# Patient Record
Sex: MALE | Race: WHITE | URBAN - METROPOLITAN AREA
[De-identification: names, ages, dates, MRNs, and addresses within clinical notes are randomized per-mention and may not be internally consistent; named-entity substitution may affect disease eponyms.]

---

## 2018-04-02 ENCOUNTER — RECORDS - HEALTHEAST (OUTPATIENT)
Dept: LAB | Facility: CLINIC | Age: 73
End: 2018-04-02

## 2018-04-03 LAB
ANION GAP SERPL CALCULATED.3IONS-SCNC: 8 MMOL/L (ref 5–18)
BUN SERPL-MCNC: 8 MG/DL (ref 8–28)
CALCIUM SERPL-MCNC: 9.1 MG/DL (ref 8.5–10.5)
CHLORIDE BLD-SCNC: 104 MMOL/L (ref 98–107)
CO2 SERPL-SCNC: 27 MMOL/L (ref 22–31)
CREAT SERPL-MCNC: 0.73 MG/DL (ref 0.7–1.3)
ERYTHROCYTE [DISTWIDTH] IN BLOOD BY AUTOMATED COUNT: 13.2 % (ref 11–14.5)
FOLATE SERPL-MCNC: 9.9 NG/ML
GFR SERPL CREATININE-BSD FRML MDRD: >60 ML/MIN/1.73M2
GLUCOSE BLD-MCNC: 94 MG/DL (ref 70–125)
HCT VFR BLD AUTO: 40.8 % (ref 40–54)
HGB BLD-MCNC: 13.5 G/DL (ref 14–18)
MCH RBC QN AUTO: 32.4 PG (ref 27–34)
MCHC RBC AUTO-ENTMCNC: 33.1 G/DL (ref 32–36)
MCV RBC AUTO: 98 FL (ref 80–100)
PLATELET # BLD AUTO: 317 THOU/UL (ref 140–440)
PMV BLD AUTO: 10.1 FL (ref 8.5–12.5)
POTASSIUM BLD-SCNC: 3.9 MMOL/L (ref 3.5–5)
RBC # BLD AUTO: 4.17 MILL/UL (ref 4.4–6.2)
SODIUM SERPL-SCNC: 139 MMOL/L (ref 136–145)
TSH SERPL DL<=0.005 MIU/L-ACNC: 1.75 UIU/ML (ref 0.3–5)
VIT B12 SERPL-MCNC: 421 PG/ML (ref 213–816)
WBC: 5.3 THOU/UL (ref 4–11)

## 2024-11-24 ENCOUNTER — DOCUMENTATION ONLY (OUTPATIENT)
Dept: GERIATRICS | Facility: CLINIC | Age: 79
End: 2024-11-24
Payer: COMMERCIAL

## 2024-11-25 ENCOUNTER — TRANSITIONAL CARE UNIT VISIT (OUTPATIENT)
Dept: GERIATRICS | Facility: CLINIC | Age: 79
End: 2024-11-25
Payer: COMMERCIAL

## 2024-11-25 VITALS
WEIGHT: 157 LBS | DIASTOLIC BLOOD PRESSURE: 52 MMHG | TEMPERATURE: 99.2 F | OXYGEN SATURATION: 95 % | SYSTOLIC BLOOD PRESSURE: 113 MMHG | RESPIRATION RATE: 18 BRPM | HEART RATE: 81 BPM | BODY MASS INDEX: 24.64 KG/M2 | HEIGHT: 67 IN

## 2024-11-25 DIAGNOSIS — C61 CARCINOMA OF PROSTATE (H): ICD-10-CM

## 2024-11-25 DIAGNOSIS — N30.80 BACTERIAL CYSTITIS: ICD-10-CM

## 2024-11-25 DIAGNOSIS — K70.30 ALCOHOLIC CIRRHOSIS, UNSPECIFIED WHETHER ASCITES PRESENT (H): ICD-10-CM

## 2024-11-25 DIAGNOSIS — N18.31 STAGE 3A CHRONIC KIDNEY DISEASE (H): ICD-10-CM

## 2024-11-25 DIAGNOSIS — Z86.73 HISTORY OF CVA (CEREBROVASCULAR ACCIDENT): ICD-10-CM

## 2024-11-25 DIAGNOSIS — Z97.8 FOLEY CATHETER IN PLACE: ICD-10-CM

## 2024-11-25 DIAGNOSIS — R47.01 EXPRESSIVE APHASIA: ICD-10-CM

## 2024-11-25 DIAGNOSIS — F32.5 MAJOR DEPRESSIVE DISORDER WITH SINGLE EPISODE, IN FULL REMISSION (H): ICD-10-CM

## 2024-11-25 DIAGNOSIS — N39.0 ACUTE URINARY TRACT INFECTION: Primary | ICD-10-CM

## 2024-11-25 PROBLEM — R60.0 EDEMA OF LOWER EXTREMITY: Status: ACTIVE | Noted: 2020-10-08

## 2024-11-25 PROBLEM — J18.9 PNEUMONIA: Status: ACTIVE | Noted: 2020-10-08

## 2024-11-25 PROBLEM — D64.9 NORMOCYTIC ANEMIA: Status: ACTIVE | Noted: 2021-06-23

## 2024-11-25 PROBLEM — C85.80 MARGINAL ZONE LYMPHOMA (H): Status: ACTIVE | Noted: 2024-11-25

## 2024-11-25 PROBLEM — F32.A DEPRESSIVE DISORDER: Status: ACTIVE | Noted: 2024-11-25

## 2024-11-25 PROBLEM — I87.2 EDEMA OF BOTH LOWER EXTREMITIES DUE TO PERIPHERAL VENOUS INSUFFICIENCY: Status: ACTIVE | Noted: 2020-05-15

## 2024-11-25 PROBLEM — H93.19 TINNITUS: Status: ACTIVE | Noted: 2024-11-25

## 2024-11-25 PROBLEM — L98.491 SKIN ULCER, LIMITED TO BREAKDOWN OF SKIN (H): Status: ACTIVE | Noted: 2024-11-01

## 2024-11-25 PROBLEM — E78.5 HYPERLIPIDEMIA WITH TARGET LOW DENSITY LIPOPROTEIN (LDL) CHOLESTEROL LESS THAN 100 MG/DL: Status: ACTIVE | Noted: 2024-11-25

## 2024-11-25 PROBLEM — R33.9 CHRONIC RETENTION OF URINE: Status: ACTIVE | Noted: 2020-10-08

## 2024-11-25 PROBLEM — L89.159 PRESSURE INJURY OF SKIN OF SACRAL REGION: Status: ACTIVE | Noted: 2020-10-08

## 2024-11-25 PROBLEM — E46 PROTEIN-CALORIE MALNUTRITION (H): Status: ACTIVE | Noted: 2022-02-17

## 2024-11-25 PROBLEM — N13.30 BILATERAL HYDRONEPHROSIS: Status: ACTIVE | Noted: 2024-11-10

## 2024-11-25 PROBLEM — R59.1 LYMPHADENOPATHY: Status: ACTIVE | Noted: 2022-02-17

## 2024-11-25 PROBLEM — J30.2 SEASONAL ALLERGIES: Status: ACTIVE | Noted: 2022-01-14

## 2024-11-25 PROBLEM — L08.1 ERYTHRASMA: Status: ACTIVE | Noted: 2021-02-26

## 2024-11-25 PROBLEM — I87.2 STASIS DERMATITIS: Status: ACTIVE | Noted: 2020-09-05

## 2024-11-25 PROBLEM — R26.9 GAIT ABNORMALITY: Status: ACTIVE | Noted: 2024-11-25

## 2024-11-25 PROBLEM — E53.8 VITAMIN B12 DEFICIENCY: Status: ACTIVE | Noted: 2022-05-01

## 2024-11-25 PROBLEM — C85.90: Status: ACTIVE | Noted: 2023-01-09

## 2024-11-25 PROBLEM — N32.89 BLADDER SPASM: Status: ACTIVE | Noted: 2022-02-17

## 2024-11-25 PROBLEM — R53.1 WEAKNESS: Status: ACTIVE | Noted: 2024-10-31

## 2024-11-25 PROBLEM — L89.92 PRESSURE INJURY, STAGE 2 (H): Status: ACTIVE | Noted: 2021-02-04

## 2024-11-25 PROBLEM — F01.518 VASCULAR DEMENTIA WITH BEHAVIOR DISTURBANCE (H): Status: ACTIVE | Noted: 2020-10-08

## 2024-11-25 PROBLEM — I63.9 STROKE (H): Status: ACTIVE | Noted: 2024-11-25

## 2024-11-25 PROBLEM — L03.119 CELLULITIS OF LOWER EXTREMITY: Status: ACTIVE | Noted: 2024-11-01

## 2024-11-25 PROBLEM — T83.511A: Status: ACTIVE | Noted: 2020-10-08

## 2024-11-25 PROBLEM — B37.2 CANDIDAL DERMATITIS: Status: ACTIVE | Noted: 2022-06-29

## 2024-11-25 PROBLEM — E55.9 VITAMIN D DEFICIENCY: Status: ACTIVE | Noted: 2022-03-09

## 2024-11-25 PROBLEM — R19.00 RETROPERITONEAL MASS: Status: ACTIVE | Noted: 2022-06-29

## 2024-11-25 PROBLEM — H90.3 SENSORINEURAL HEARING LOSS, BILATERAL: Status: ACTIVE | Noted: 2024-11-25

## 2024-11-25 PROBLEM — J43.0 UNILATERAL EMPHYSEMA (H): Status: ACTIVE | Noted: 2022-01-14

## 2024-11-25 PROCEDURE — 99310 SBSQ NF CARE HIGH MDM 45: CPT | Performed by: NURSE PRACTITIONER

## 2024-11-25 RX ORDER — SERTRALINE HYDROCHLORIDE 25 MG/1
125 TABLET, FILM COATED ORAL DAILY
COMMUNITY

## 2024-11-25 RX ORDER — ACETAMINOPHEN 325 MG/1
325-650 TABLET ORAL EVERY 4 HOURS PRN
COMMUNITY

## 2024-11-25 RX ORDER — ALBUTEROL SULFATE 90 UG/1
2 INHALANT RESPIRATORY (INHALATION) EVERY 6 HOURS PRN
COMMUNITY

## 2024-11-25 RX ORDER — ATORVASTATIN CALCIUM 20 MG/1
20 TABLET, FILM COATED ORAL DAILY
COMMUNITY

## 2024-11-25 RX ORDER — ASPIRIN 81 MG/1
81 TABLET ORAL DAILY
COMMUNITY

## 2024-11-25 RX ORDER — NYSTATIN 100000 U/G
CREAM TOPICAL 2 TIMES DAILY
COMMUNITY

## 2024-11-25 RX ORDER — CALCIUM CARBONATE/VITAMIN D3 600 MG-10
1 TABLET ORAL 2 TIMES DAILY
COMMUNITY

## 2024-11-25 RX ORDER — OXYBUTYNIN CHLORIDE 5 MG/1
5 TABLET ORAL 3 TIMES DAILY
COMMUNITY

## 2024-11-25 RX ORDER — LIDOCAINE 40 MG/G
CREAM TOPICAL
COMMUNITY

## 2024-11-25 RX ORDER — AMMONIUM LACTATE 12 G/100G
CREAM TOPICAL 2 TIMES DAILY
COMMUNITY

## 2024-11-25 NOTE — LETTER
11/25/2024      Kade Mcgill  [address] [facility] [facility] [address]        [facility] GERIATRICS    PRIMARY CARE PROVIDER AND CLINIC:  Provider Outside, No address on file  Chief Complaint   Patient presents with     Hospital F/U      [place] Medical Record Number:  [id number]  Place of Service where encounter took place:  [facility] ([facility]) [[id number]]    Kade Mcgill  is a 79 year old  (1945), admitted to the above facility from  [facility] . Hospital stay 11/10/2024 through 11/22/2024..     Patient is seen today for initial NP visit in the TCU:  1. Acute urinary tract infection    2. Alcoholic cirrhosis, unspecified whether ascites present (H)    3. Bacterial cystitis    4. Carcinoma of prostate (H)    5. History of CVA (cerebrovascular accident)    6. Expressive aphasia    7. Stage 3a chronic kidney disease (H)    8. Baron catheter in place    9. Major depressive disorder with single episode, in full remission (H)        HPI:    Treated inpatient for UTI, continues on bactrim through today 11/25. Has a chronic baron. Denies HA, chest pain, palpitations, dizziness. HR controlled. BP stable so far in TCU. No troubles breathing, no SOB, no Concerns with urination- urine dark yellow with fabian blood noted, no constipation. No Eating and drinking very much, he did drink a boost. Sleeping okay. Denies pain.   Lives at home in house with stepfather and stepson, ambulates with walker, WC.      CODE STATUS/ADVANCE DIRECTIVES DISCUSSION:  No Order  DNR / DNI  ALLERGIES:   Allergies   Allergen Reactions     Ciprofloxacin Rash     Rash all over body.  Some itching   He tolerated levofloxacin on 9/2015.    Tolerated Levaquin 1/2023.     Ceftriaxone Rash     Tolerated Zosyn 4/7/2016.     Iodine Other (See Comments) and Rash     Topical iodine     Latex Rash     Povidone Iodine Rash      PAST MEDICAL HISTORY:   Past Medical History:   Diagnosis Date     History of alcohol abuse      History of  cirrhosis of the liver      History of malaria      History of stroke      Hyperlipaemia      Leg fracture, left      Tobacco abuse       PAST SURGICAL HISTORY:   has a past surgical history that includes orthopedic surgery (2011) and vascular surgery.  FAMILY HISTORY: family history includes Cancer in his father; Diabetes in his maternal grandmother; Hypertension in his mother.  SOCIAL HISTORY:   reports that he has been smoking. He has never used smokeless tobacco. He reports current alcohol use. He reports that he does not use drugs.  Patient's living condition: lives with family,       Post Discharge Medication Reconciliation Status:   MED REC REQUIRED    Post Medication Reconciliation Status: discharge medications reconciled and changed, per note/orders       Current Outpatient Medications   Medication Sig Dispense Refill     acetaminophen (TYLENOL) 325 MG tablet Take 325-650 mg by mouth every 4 hours as needed for mild pain.       albuterol (PROAIR HFA/PROVENTIL HFA/VENTOLIN HFA) 108 (90 Base) MCG/ACT inhaler Inhale 2 puffs into the lungs every 6 hours as needed for shortness of breath, wheezing or cough.       ammonium lactate (AMLACTIN) 12 % external cream Apply topically 2 times daily.       aspirin 81 MG EC tablet Take 81 mg by mouth daily.       atorvastatin (LIPITOR) 20 MG tablet Take 20 mg by mouth daily.       calcium carbonate-vitamin D (CALTRATE) 600-10 MG-MCG per tablet Take 1 tablet by mouth 2 times daily.       cholecalciferol (VITAMIN D3) 125 mcg (5000 units) capsule Take 125 mcg by mouth daily.       D-Mannose 500 MG CAPS Take 500 mg by mouth 3 times daily.       lidocaine (LMX4) 4 % external cream Apply topically once as needed for mild pain.       nystatin (MYCOSTATIN) 380322 UNIT/GM external cream Apply topically 2 times daily.       oxyBUTYnin (DITROPAN) 5 MG tablet Take 5 mg by mouth 3 times daily.       sertraline (ZOLOFT) 25 MG tablet Take 125 mg by mouth daily.       No current  "facility-administered medications for this visit.       ROS:  10 point ROS of systems including Constitutional, Eyes, Respiratory, Cardiovascular, Gastroenterology, Genitourinary, Integumentary, Musculoskeletal, Psychiatric were all negative except for pertinent positives noted in my HPI.    Vitals:  /52   Pulse 81   Temp 99.2  F (37.3  C)   Resp 18   Ht 1.702 m (5' 7\")   Wt 71.2 kg (157 lb)   SpO2 95%   BMI 24.59 kg/m    Exam:  GENERAL APPEARANCE:  Alert, in no distress, oriented, cooperative. Comfortable in bed   ENT:  Mouth and posterior oropharynx normal, moist mucous membranes. [place]  EYES:  EOM, conjunctivae, lids, pupils and irises normal  NECK:  No adenopathy,masses or thyromegaly  RESP:  respiratory effort and palpation of chest normal, lungs clear to auscultation , no respiratory distress  CV:  Palpation and auscultation of heart done , regular rate and rhythm, no murmur, rub, or gallop, no edema to LE  GI/ABDOMEN:  normal bowel sounds, soft, nontender, no hepatosplenomegaly or other masses. Orosco in place with fabian blood  M/S:   moves extremities spontaneously.   SKIN:  no rashes to exposed skin.   NEURO:   intact   PSYCH:  oriented X 3, normal insight, judgement and memory, affect and mood normal,       Lab/Diagnostic data:  Recent labs in [facility] reviewed by me today.     CBC:  11/15/24:   WBC 6.7, Hgb 10.5  Creat 0.77    Last Comprehensive Metabolic Panel:  Lab Results   Component Value Date     04/03/2018    POTASSIUM 3.9 04/03/2018    CHLORIDE 104 04/03/2018    CO2 27 04/03/2018    ANIONGAP 8 04/03/2018    GLC 94 04/03/2018    BUN 8 04/03/2018    CR 0.73 04/03/2018    GFRESTIMATED >60 04/03/2018    TRESSA 9.1 04/03/2018     Lab Results   Component Value Date    WBC 5.3 04/03/2018    WBC 7.4 06/28/2011     Lab Results   Component Value Date    RBC 4.17 04/03/2018    RBC 4.24 06/28/2011     Lab Results   Component Value Date    HGB 13.5 04/03/2018    HGB 13.9 06/28/2011     Lab Results "   Component Value Date    HCT 40.8 04/03/2018    HCT 40.0 06/28/2011     Lab Results   Component Value Date    MCV 98 04/03/2018    MCV 94 06/28/2011     Lab Results   Component Value Date    MCH 32.4 04/03/2018    MCH 32.8 06/28/2011     Lab Results   Component Value Date    MCHC 33.1 04/03/2018    MCHC 34.8 06/28/2011     Lab Results   Component Value Date    RDW 13.2 04/03/2018    RDW 13.3 06/28/2011     Lab Results   Component Value Date     04/03/2018     06/28/2011         ASSESSMENT/PLAN:    (N39.0) Acute urinary tract infection  (primary encounter diagnosis)  (Z97.8) Baron catheter in place  (N30.80) Bacterial cystitis  (C61) Carcinoma of prostate (H)  Comment: Chronic baron, fabian blood noted today. No pain. Continues on oral Bactrim with end dose today 11/25  Plan: Monitor, continue baron.     (K70.30) Alcoholic cirrhosis, unspecified whether ascites present (H)  Comment: noted   Plan: encourage cessation    (Z86.73) History of CVA (cerebrovascular accident)  (R47.01) Expressive aphasia  Comment: ASA, statin PTA   Plan: Continue, PT/OT    (N18.31) Stage 3a chronic kidney disease (H)  Creat 0.77  Monitor     (F32.5) Major depressive disorder with single episode, in full remission (H)  Comment: PTA sertraline   Plan: continue         Orders:  Boost BID in between meals    Electronically signed by:  Francia Dillon CNP     Total time greater than 45 minutes reviewing chart in EPIC and PCC, medications, labs, vitals. Discussing with social work, physical therapy, occupational therapy and nursing.                   Sincerely,        Francia Dillon CNP

## 2024-11-25 NOTE — PROGRESS NOTES
[facility] GERIATRICS    PRIMARY CARE PROVIDER AND CLINIC:  Provider Outside, No address on file  Chief Complaint   Patient presents with    Hospital F/U      [place] Medical Record Number:  [id number]  Place of Service where encounter took place:  [facility] ([facility]) [[id number]]    Kade Mcgill  is a 79 year old  (1945), admitted to the above facility from  [facility] . Hospital stay 11/10/2024 through 11/22/2024..     Patient is seen today for initial NP visit in the TCU:  1. Acute urinary tract infection    2. Alcoholic cirrhosis, unspecified whether ascites present (H)    3. Bacterial cystitis    4. Carcinoma of prostate (H)    5. History of CVA (cerebrovascular accident)    6. Expressive aphasia    7. Stage 3a chronic kidney disease (H)    8. Baron catheter in place    9. Major depressive disorder with single episode, in full remission (H)        HPI:    Treated inpatient for UTI, continues on bactrim through today 11/25. Has a chronic baron. Denies HA, chest pain, palpitations, dizziness. HR controlled. BP stable so far in TCU. No troubles breathing, no SOB, no Concerns with urination- urine dark yellow with fabian blood noted, no constipation. No Eating and drinking very much, he did drink a boost. Sleeping okay. Denies pain.   Lives at home in house with stepfather and stepson, ambulates with walker, WC.      CODE STATUS/ADVANCE DIRECTIVES DISCUSSION:  No Order  DNR / DNI  ALLERGIES:   Allergies   Allergen Reactions    Ciprofloxacin Rash     Rash all over body.  Some itching   He tolerated levofloxacin on 9/2015.    Tolerated Levaquin 1/2023.    Ceftriaxone Rash     Tolerated Zosyn 4/7/2016.    Iodine Other (See Comments) and Rash     Topical iodine    Latex Rash    Povidone Iodine Rash      PAST MEDICAL HISTORY:   Past Medical History:   Diagnosis Date    History of alcohol abuse     History of cirrhosis of the liver     History of malaria     History of stroke      Hyperlipaemia     Leg fracture, left     Tobacco abuse       PAST SURGICAL HISTORY:   has a past surgical history that includes orthopedic surgery (2011) and vascular surgery.  FAMILY HISTORY: family history includes Cancer in his father; Diabetes in his maternal grandmother; Hypertension in his mother.  SOCIAL HISTORY:   reports that he has been smoking. He has never used smokeless tobacco. He reports current alcohol use. He reports that he does not use drugs.  Patient's living condition: lives with family,       Post Discharge Medication Reconciliation Status:   MED REC REQUIRED    Post Medication Reconciliation Status: discharge medications reconciled and changed, per note/orders       Current Outpatient Medications   Medication Sig Dispense Refill    acetaminophen (TYLENOL) 325 MG tablet Take 325-650 mg by mouth every 4 hours as needed for mild pain.      albuterol (PROAIR HFA/PROVENTIL HFA/VENTOLIN HFA) 108 (90 Base) MCG/ACT inhaler Inhale 2 puffs into the lungs every 6 hours as needed for shortness of breath, wheezing or cough.      ammonium lactate (AMLACTIN) 12 % external cream Apply topically 2 times daily.      aspirin 81 MG EC tablet Take 81 mg by mouth daily.      atorvastatin (LIPITOR) 20 MG tablet Take 20 mg by mouth daily.      calcium carbonate-vitamin D (CALTRATE) 600-10 MG-MCG per tablet Take 1 tablet by mouth 2 times daily.      cholecalciferol (VITAMIN D3) 125 mcg (5000 units) capsule Take 125 mcg by mouth daily.      D-Mannose 500 MG CAPS Take 500 mg by mouth 3 times daily.      lidocaine (LMX4) 4 % external cream Apply topically once as needed for mild pain.      nystatin (MYCOSTATIN) 462491 UNIT/GM external cream Apply topically 2 times daily.      oxyBUTYnin (DITROPAN) 5 MG tablet Take 5 mg by mouth 3 times daily.      sertraline (ZOLOFT) 25 MG tablet Take 125 mg by mouth daily.       No current facility-administered medications for this visit.       ROS:  10 point ROS of systems including  "Constitutional, Eyes, Respiratory, Cardiovascular, Gastroenterology, Genitourinary, Integumentary, Musculoskeletal, Psychiatric were all negative except for pertinent positives noted in my HPI.    Vitals:  /52   Pulse 81   Temp 99.2  F (37.3  C)   Resp 18   Ht 1.702 m (5' 7\")   Wt 71.2 kg (157 lb)   SpO2 95%   BMI 24.59 kg/m    Exam:  GENERAL APPEARANCE:  Alert, in no distress, oriented, cooperative. Comfortable in bed   ENT:  Mouth and posterior oropharynx normal, moist mucous membranes. [place]  EYES:  EOM, conjunctivae, lids, pupils and irises normal  NECK:  No adenopathy,masses or thyromegaly  RESP:  respiratory effort and palpation of chest normal, lungs clear to auscultation , no respiratory distress  CV:  Palpation and auscultation of heart done , regular rate and rhythm, no murmur, rub, or gallop, no edema to LE  GI/ABDOMEN:  normal bowel sounds, soft, nontender, no hepatosplenomegaly or other masses. Orosco in place with fabian blood  M/S:   moves extremities spontaneously.   SKIN:  no rashes to exposed skin.   NEURO:   intact   PSYCH:  oriented X 3, normal insight, judgement and memory, affect and mood normal,       Lab/Diagnostic data:  Recent labs in [facility] reviewed by me today.     CBC:  11/15/24:   WBC 6.7, Hgb 10.5  Creat 0.77    Last Comprehensive Metabolic Panel:  Lab Results   Component Value Date     04/03/2018    POTASSIUM 3.9 04/03/2018    CHLORIDE 104 04/03/2018    CO2 27 04/03/2018    ANIONGAP 8 04/03/2018    GLC 94 04/03/2018    BUN 8 04/03/2018    CR 0.73 04/03/2018    GFRESTIMATED >60 04/03/2018    TRESSA 9.1 04/03/2018     Lab Results   Component Value Date    WBC 5.3 04/03/2018    WBC 7.4 06/28/2011     Lab Results   Component Value Date    RBC 4.17 04/03/2018    RBC 4.24 06/28/2011     Lab Results   Component Value Date    HGB 13.5 04/03/2018    HGB 13.9 06/28/2011     Lab Results   Component Value Date    HCT 40.8 04/03/2018    HCT 40.0 06/28/2011     Lab Results   Component " Value Date    MCV 98 04/03/2018    MCV 94 06/28/2011     Lab Results   Component Value Date    MCH 32.4 04/03/2018    MCH 32.8 06/28/2011     Lab Results   Component Value Date    MCHC 33.1 04/03/2018    MCHC 34.8 06/28/2011     Lab Results   Component Value Date    RDW 13.2 04/03/2018    RDW 13.3 06/28/2011     Lab Results   Component Value Date     04/03/2018     06/28/2011         ASSESSMENT/PLAN:    (N39.0) Acute urinary tract infection  (primary encounter diagnosis)  (Z97.8) Baron catheter in place  (N30.80) Bacterial cystitis  (C61) Carcinoma of prostate (H)  Comment: Chronic baron, fabian blood noted today. No pain. Continues on oral Bactrim with end dose today 11/25  Plan: Monitor, continue baron.     (K70.30) Alcoholic cirrhosis, unspecified whether ascites present (H)  Comment: noted   Plan: encourage cessation    (Z86.73) History of CVA (cerebrovascular accident)  (R47.01) Expressive aphasia  Comment: ASA, statin PTA   Plan: Continue, PT/OT    (N18.31) Stage 3a chronic kidney disease (H)  Creat 0.77  Monitor     (F32.5) Major depressive disorder with single episode, in full remission (H)  Comment: PTA sertraline   Plan: continue         Orders:  Boost BID in between meals    Electronically signed by:  Francia Dillon CNP     Total time greater than 45 minutes reviewing chart in EPIC and PCC, medications, labs, vitals. Discussing with social work, physical therapy, occupational therapy and nursing.

## 2024-12-02 ENCOUNTER — LAB REQUISITION (OUTPATIENT)
Dept: LAB | Facility: CLINIC | Age: 79
End: 2024-12-02
Payer: COMMERCIAL

## 2024-12-02 ENCOUNTER — TRANSITIONAL CARE UNIT VISIT (OUTPATIENT)
Dept: GERIATRICS | Facility: CLINIC | Age: 79
End: 2024-12-02
Payer: COMMERCIAL

## 2024-12-02 VITALS
HEART RATE: 66 BPM | TEMPERATURE: 98.3 F | BODY MASS INDEX: 24.71 KG/M2 | OXYGEN SATURATION: 97 % | RESPIRATION RATE: 18 BRPM | HEIGHT: 67 IN | SYSTOLIC BLOOD PRESSURE: 135 MMHG | WEIGHT: 157.4 LBS | DIASTOLIC BLOOD PRESSURE: 56 MMHG

## 2024-12-02 DIAGNOSIS — N39.0 ACUTE URINARY TRACT INFECTION: ICD-10-CM

## 2024-12-02 DIAGNOSIS — Z97.8 FOLEY CATHETER IN PLACE: ICD-10-CM

## 2024-12-02 DIAGNOSIS — N18.31 STAGE 3A CHRONIC KIDNEY DISEASE (H): ICD-10-CM

## 2024-12-02 DIAGNOSIS — N30.80 BACTERIAL CYSTITIS: Primary | ICD-10-CM

## 2024-12-02 DIAGNOSIS — C61 CARCINOMA OF PROSTATE (H): ICD-10-CM

## 2024-12-02 DIAGNOSIS — Z86.73 HISTORY OF CVA (CEREBROVASCULAR ACCIDENT): ICD-10-CM

## 2024-12-02 DIAGNOSIS — K70.30 ALCOHOLIC CIRRHOSIS, UNSPECIFIED WHETHER ASCITES PRESENT (H): ICD-10-CM

## 2024-12-02 DIAGNOSIS — F32.5 MAJOR DEPRESSIVE DISORDER WITH SINGLE EPISODE, IN FULL REMISSION (H): ICD-10-CM

## 2024-12-02 DIAGNOSIS — R65.20 SEVERE SEPSIS WITHOUT SEPTIC SHOCK (CODE) (H): ICD-10-CM

## 2024-12-02 DIAGNOSIS — R47.01 EXPRESSIVE APHASIA: ICD-10-CM

## 2024-12-02 PROCEDURE — 99310 SBSQ NF CARE HIGH MDM 45: CPT | Performed by: NURSE PRACTITIONER

## 2024-12-02 NOTE — PROGRESS NOTES
[facility] GERIATRICS    PRIMARY CARE PROVIDER AND CLINIC:  Provider Outside, No address on file  Chief Complaint   Patient presents with    SAMRA      [place] Medical Record Number:  [id number]  Place of Service where encounter took place:  [facility] ([facility]) [[id number]]    Kade Mcgill  is a 79 year old  (1945), admitted to the above facility from  [facility] . Hospital stay 11/10/2024 through 11/22/2024..     Patient is seen today for follow up NP visit in the TCU:  1. Bacterial cystitis    2. Alcoholic cirrhosis, unspecified whether ascites present (H)    3. History of CVA (cerebrovascular accident)    4. Expressive aphasia    5. Baron catheter in place    6. Stage 3a chronic kidney disease (H)    7. Carcinoma of prostate (H)    8. Acute urinary tract infection    9. Major depressive disorder with single episode, in full remission (H)        HPI:    Treated inpatient for UTI, continues on bactrim through completed on 11/25. Has a chronic baron. Hematuria present today, it was clear over the weekend. Denies HA, chest pain, palpitations, dizziness. No troubles breathing, no SOB, no Concerns with urination- urine dark yellow with fabian blood noted, no constipation. Boost ordered as he eats and drinks little. Sleeping okay. Denies pain.   Lives at home in house with stepfather and stepson, ambulates with walker, WC.      CODE STATUS/ADVANCE DIRECTIVES DISCUSSION:  No CPR- Do NOT Intubate  DNR / DNI  ALLERGIES:   Allergies   Allergen Reactions    Ciprofloxacin Rash     Rash all over body.  Some itching   He tolerated levofloxacin on 9/2015.    Tolerated Levaquin 1/2023.    Ceftriaxone Rash     Tolerated Zosyn 4/7/2016.    Iodine Other (See Comments) and Rash     Topical iodine    Latex Rash    Povidone Iodine Rash      PAST MEDICAL HISTORY:   Past Medical History:   Diagnosis Date    History of alcohol abuse     History of cirrhosis of the liver     History of malaria     History of  stroke     Hyperlipaemia     Leg fracture, left     Tobacco abuse       PAST SURGICAL HISTORY:   has a past surgical history that includes orthopedic surgery (2011) and vascular surgery.  FAMILY HISTORY: family history includes Cancer in his father; Diabetes in his maternal grandmother; Hypertension in his mother.  SOCIAL HISTORY:   reports that he has been smoking. He has never used smokeless tobacco. He reports current alcohol use. He reports that he does not use drugs.  Patient's living condition: lives with family,       Post Discharge Medication Reconciliation Status:   MED REC REQUIRED    Post Medication Reconciliation Status: discharge medications reconciled and changed, per note/orders       Current Outpatient Medications   Medication Sig Dispense Refill    acetaminophen (TYLENOL) 325 MG tablet Take 325-650 mg by mouth every 4 hours as needed for mild pain.      albuterol (PROAIR HFA/PROVENTIL HFA/VENTOLIN HFA) 108 (90 Base) MCG/ACT inhaler Inhale 2 puffs into the lungs every 6 hours as needed for shortness of breath, wheezing or cough.      ammonium lactate (AMLACTIN) 12 % external cream Apply topically 2 times daily.      aspirin 81 MG EC tablet Take 81 mg by mouth daily.      atorvastatin (LIPITOR) 20 MG tablet Take 20 mg by mouth daily.      calcium carbonate-vitamin D (CALTRATE) 600-10 MG-MCG per tablet Take 1 tablet by mouth 2 times daily.      cholecalciferol (VITAMIN D3) 125 mcg (5000 units) capsule Take 125 mcg by mouth daily.      D-Mannose 500 MG CAPS Take 500 mg by mouth 3 times daily.      lidocaine (LMX4) 4 % external cream Apply topically once as needed for mild pain.      nystatin (MYCOSTATIN) 942908 UNIT/GM external cream Apply topically 2 times daily.      oxyBUTYnin (DITROPAN) 5 MG tablet Take 5 mg by mouth 3 times daily.      sertraline (ZOLOFT) 25 MG tablet Take 125 mg by mouth daily.       No current facility-administered medications for this visit.       ROS:  10 point ROS of systems  "including Constitutional, Eyes, Respiratory, Cardiovascular, Gastroenterology, Genitourinary, Integumentary, Musculoskeletal, Psychiatric were all negative except for pertinent positives noted in my HPI.    Vitals:  /56   Pulse 66   Temp 98.3  F (36.8  C)   Resp 18   Ht 1.702 m (5' 7\")   Wt 71.4 kg (157 lb 6.4 oz)   SpO2 97%   BMI 24.65 kg/m    Exam:  GENERAL APPEARANCE:  Alert, in no distress, oriented, cooperative. Comfortable sitting up in chair eating breakfast.   ENT:  Mouth and posterior oropharynx normal, moist mucous membranes. [place]  EYES:  EOM, conjunctivae, lids, pupils and irises normal  NECK:  No adenopathy,masses or thyromegaly  RESP:  respiratory effort and palpation of chest normal, lungs clear to auscultation , no respiratory distress  CV:  Palpation and auscultation of heart done , regular rate and rhythm, no murmur, rub, or gallop, no edema to LE  GI/ABDOMEN:  normal bowel sounds, soft, nontender, no hepatosplenomegaly or other masses. [place]rosco in place with fabian blood  M/S:   moves extremities spontaneously.   SKIN:  no rashes to exposed skin.   NEURO:   intact   PSYCH:  oriented X 3, normal insight, judgement and memory, affect and mood normal,       Lab/Diagnostic data:  Recent labs in [facility] reviewed by me today.       CBC:  11/15/24:   WBC 6.7, Hgb 10.5  Creat 0.77    Last Comprehensive Metabolic Panel:  Lab Results   Component Value Date     04/03/2018    POTASSIUM 3.9 04/03/2018    CHLORIDE 104 04/03/2018    CO2 27 04/03/2018    ANIONGAP 8 04/03/2018    GLC 94 04/03/2018    BUN 8 04/03/2018    CR 0.73 04/03/2018    GFRESTIMATED >60 04/03/2018    TRESSA 9.1 04/03/2018     Lab Results   Component Value Date    WBC 5.3 04/03/2018    WBC 7.4 06/28/2011     Lab Results   Component Value Date    RBC 4.17 04/03/2018    RBC 4.24 06/28/2011     Lab Results   Component Value Date    HGB 13.5 04/03/2018    HGB 13.9 06/28/2011     Lab Results   Component Value Date    HCT 40.8 04/03/2018    " HCT 40.0 06/28/2011     Lab Results   Component Value Date    MCV 98 04/03/2018    MCV 94 06/28/2011     Lab Results   Component Value Date    MCH 32.4 04/03/2018    MCH 32.8 06/28/2011     Lab Results   Component Value Date    MCHC 33.1 04/03/2018    MCHC 34.8 06/28/2011     Lab Results   Component Value Date    RDW 13.2 04/03/2018    RDW 13.3 06/28/2011     Lab Results   Component Value Date     04/03/2018     06/28/2011         ASSESSMENT/PLAN:    (N39.0) Acute urinary tract infection  (primary encounter diagnosis)  (Z97.8) Baron catheter in place  (N30.80) Bacterial cystitis  (C61) Carcinoma of prostate (H)  Comment: Chronic baron, fabian blood noted today. No pain. Completed oral Bactrim 11/25  Plan: Monitor, continue baron.     Fabian blood in urine:  Intermittent  May be pulling at catheter, will hold ASA for a few days and monitor     (K70.30) Alcoholic cirrhosis, unspecified whether ascites present (H)  Comment: noted   Plan: encourage cessation    (Z86.73) History of CVA (cerebrovascular accident)  (R47.01) Expressive aphasia  Comment: ASA, statin PTA   Plan: Continue, PT/OT    (N18.31) Stage 3a chronic kidney disease (H)  Creat 0.77  Monitor     (F32.5) Major depressive disorder with single episode, in full remission (H)  Comment: PTA sertraline   Plan: continue     Electronically signed by:  Francia Dillon CNP     Total time greater than 45 minutes reviewing chart in EPIC and PCC, medications, labs, vitals. Discussing with social work, physical therapy, occupational therapy and nursing.

## 2024-12-02 NOTE — LETTER
12/2/2024      Kade Mcgill  [address] [facility] [address]        [facility] GERIATRICS    PRIMARY CARE PROVIDER AND CLINIC:  Provider Outside, No address on file  Chief Complaint   Patient presents with     RECHECK      [place] Medical Record Number:  [id number]  Place of Service where encounter took place:  [facility] ([facility]) [[id number]]    Kade Mcgill  is a 79 year old  (1945), admitted to the above facility from  [facility] . Hospital stay 11/10/2024 through 11/22/2024..     Patient is seen today for follow up NP visit in the TCU:  1. Bacterial cystitis    2. Alcoholic cirrhosis, unspecified whether ascites present (H)    3. History of CVA (cerebrovascular accident)    4. Expressive aphasia    5. Baron catheter in place    6. Stage 3a chronic kidney disease (H)    7. Carcinoma of prostate (H)    8. Acute urinary tract infection    9. Major depressive disorder with single episode, in full remission (H)        HPI:    Treated inpatient for UTI, continues on bactrim through completed on 11/25. Has a chronic baron. Hematuria present today, it was clear over the weekend. Denies HA, chest pain, palpitations, dizziness. No troubles breathing, no SOB, no Concerns with urination- urine dark yellow with fabian blood noted, no constipation. Boost ordered as he eats and drinks little. Sleeping okay. Denies pain.   Lives at home in house with stepfather and stepson, ambulates with walker, WC.      CODE STATUS/ADVANCE DIRECTIVES DISCUSSION:  No CPR- Do NOT Intubate  DNR / DNI  ALLERGIES:   Allergies   Allergen Reactions     Ciprofloxacin Rash     Rash all over body.  Some itching   He tolerated levofloxacin on 9/2015.    Tolerated Levaquin 1/2023.     Ceftriaxone Rash     Tolerated Zosyn 4/7/2016.     Iodine Other (See Comments) and Rash     Topical iodine     Latex Rash     Povidone Iodine Rash      PAST MEDICAL HISTORY:   Past Medical History:   Diagnosis Date     History of alcohol  abuse      History of cirrhosis of the liver      History of malaria      History of stroke      Hyperlipaemia      Leg fracture, left      Tobacco abuse       PAST SURGICAL HISTORY:   has a past surgical history that includes orthopedic surgery (2011) and vascular surgery.  FAMILY HISTORY: family history includes Cancer in his father; Diabetes in his maternal grandmother; Hypertension in his mother.  SOCIAL HISTORY:   reports that he has been smoking. He has never used smokeless tobacco. He reports current alcohol use. He reports that he does not use drugs.  Patient's living condition: lives with family,       Post Discharge Medication Reconciliation Status:   MED REC REQUIRED    Post Medication Reconciliation Status: discharge medications reconciled and changed, per note/orders       Current Outpatient Medications   Medication Sig Dispense Refill     acetaminophen (TYLENOL) 325 MG tablet Take 325-650 mg by mouth every 4 hours as needed for mild pain.       albuterol (PROAIR HFA/PROVENTIL HFA/VENTOLIN HFA) 108 (90 Base) MCG/ACT inhaler Inhale 2 puffs into the lungs every 6 hours as needed for shortness of breath, wheezing or cough.       ammonium lactate (AMLACTIN) 12 % external cream Apply topically 2 times daily.       aspirin 81 MG EC tablet Take 81 mg by mouth daily.       atorvastatin (LIPITOR) 20 MG tablet Take 20 mg by mouth daily.       calcium carbonate-vitamin D (CALTRATE) 600-10 MG-MCG per tablet Take 1 tablet by mouth 2 times daily.       cholecalciferol (VITAMIN D3) 125 mcg (5000 units) capsule Take 125 mcg by mouth daily.       D-Mannose 500 MG CAPS Take 500 mg by mouth 3 times daily.       lidocaine (LMX4) 4 % external cream Apply topically once as needed for mild pain.       nystatin (MYCOSTATIN) 133869 UNIT/GM external cream Apply topically 2 times daily.       oxyBUTYnin (DITROPAN) 5 MG tablet Take 5 mg by mouth 3 times daily.       sertraline (ZOLOFT) 25 MG tablet Take 125 mg by mouth daily.    "    No current facility-administered medications for this visit.       ROS:  10 point ROS of systems including Constitutional, Eyes, Respiratory, Cardiovascular, Gastroenterology, Genitourinary, Integumentary, Musculoskeletal, Psychiatric were all negative except for pertinent positives noted in my HPI.    Vitals:  /56   Pulse 66   Temp 98.3  F (36.8  C)   Resp 18   Ht 1.702 m (5' 7\")   Wt 71.4 kg (157 lb 6.4 oz)   SpO2 97%   BMI 24.65 kg/m    Exam:  GENERAL APPEARANCE:  Alert, in no distress, oriented, cooperative. Comfortable sitting up in chair eating breakfast.   ENT:  Mouth and posterior oropharynx normal, moist mucous membranes. [place]  EYES:  EOM, conjunctivae, lids, pupils and irises normal  NECK:  No adenopathy,masses or thyromegaly  RESP:  respiratory effort and palpation of chest normal, lungs clear to auscultation , no respiratory distress  CV:  Palpation and auscultation of heart done , regular rate and rhythm, no murmur, rub, or gallop, no edema to LE  GI/ABDOMEN:  normal bowel sounds, soft, nontender, no hepatosplenomegaly or other masses. [place] in place with fabian blood  M/S:   moves extremities spontaneously.   SKIN:  no rashes to exposed skin.   NEURO:   intact   PSYCH:  oriented X 3, normal insight, judgement and memory, affect and mood normal,       Lab/Diagnostic data:  Recent labs in [facility] reviewed by me today.       CBC:  11/15/24:   WBC 6.7, Hgb 10.5  Creat 0.77    Last Comprehensive Metabolic Panel:  Lab Results   Component Value Date     04/03/2018    POTASSIUM 3.9 04/03/2018    CHLORIDE 104 04/03/2018    CO2 27 04/03/2018    ANIONGAP 8 04/03/2018    GLC 94 04/03/2018    BUN 8 04/03/2018    CR 0.73 04/03/2018    GFRESTIMATED >60 04/03/2018    TRESSA 9.1 04/03/2018     Lab Results   Component Value Date    WBC 5.3 04/03/2018    WBC 7.4 06/28/2011     Lab Results   Component Value Date    RBC 4.17 04/03/2018    RBC 4.24 06/28/2011     Lab Results   Component Value Date    HGB 13.5 " 04/03/2018    HGB 13.9 06/28/2011     Lab Results   Component Value Date    HCT 40.8 04/03/2018    HCT 40.0 06/28/2011     Lab Results   Component Value Date    MCV 98 04/03/2018    MCV 94 06/28/2011     Lab Results   Component Value Date    MCH 32.4 04/03/2018    MCH 32.8 06/28/2011     Lab Results   Component Value Date    MCHC 33.1 04/03/2018    MCHC 34.8 06/28/2011     Lab Results   Component Value Date    RDW 13.2 04/03/2018    RDW 13.3 06/28/2011     Lab Results   Component Value Date     04/03/2018     06/28/2011         ASSESSMENT/PLAN:    (N39.0) Acute urinary tract infection  (primary encounter diagnosis)  (Z97.8) Baron catheter in place  (N30.80) Bacterial cystitis  (C61) Carcinoma of prostate (H)  Comment: Chronic baron, fabian blood noted today. No pain. Completed oral Bactrim 11/25  Plan: Monitor, continue baron.     Fabian blood in urine:  Intermittent  May be pulling at catheter, will hold ASA for a few days and monitor     (K70.30) Alcoholic cirrhosis, unspecified whether ascites present (H)  Comment: noted   Plan: encourage cessation    (Z86.73) History of CVA (cerebrovascular accident)  (R47.01) Expressive aphasia  Comment: ASA, statin PTA   Plan: Continue, PT/OT    (N18.31) Stage 3a chronic kidney disease (H)  Creat 0.77  Monitor     (F32.5) Major depressive disorder with single episode, in full remission (H)  Comment: PTA sertraline   Plan: continue     Electronically signed by:  Francia Dillon CNP     Total time greater than 45 minutes reviewing chart in EPIC and PCC, medications, labs, vitals. Discussing with social work, physical therapy, occupational therapy and nursing.                   Sincerely,        Francia Dillon CNP

## 2024-12-03 LAB
ANION GAP SERPL CALCULATED.3IONS-SCNC: 9 MMOL/L (ref 7–15)
BUN SERPL-MCNC: 26.2 MG/DL (ref 8–23)
CALCIUM SERPL-MCNC: 8.7 MG/DL (ref 8.8–10.4)
CHLORIDE SERPL-SCNC: 105 MMOL/L (ref 98–107)
CREAT SERPL-MCNC: 1.16 MG/DL (ref 0.67–1.17)
EGFRCR SERPLBLD CKD-EPI 2021: 65 ML/MIN/1.73M2
ERYTHROCYTE [DISTWIDTH] IN BLOOD BY AUTOMATED COUNT: 15.6 % (ref 10–15)
GLUCOSE SERPL-MCNC: 96 MG/DL (ref 70–99)
HCO3 SERPL-SCNC: 23 MMOL/L (ref 22–29)
HCT VFR BLD AUTO: 32.9 % (ref 40–53)
HGB BLD-MCNC: 10.4 G/DL (ref 13.3–17.7)
MCH RBC QN AUTO: 29.4 PG (ref 26.5–33)
MCHC RBC AUTO-ENTMCNC: 31.6 G/DL (ref 31.5–36.5)
MCV RBC AUTO: 93 FL (ref 78–100)
PLATELET # BLD AUTO: 227 10E3/UL (ref 150–450)
POTASSIUM SERPL-SCNC: 4.1 MMOL/L (ref 3.4–5.3)
RBC # BLD AUTO: 3.54 10E6/UL (ref 4.4–5.9)
SODIUM SERPL-SCNC: 137 MMOL/L (ref 135–145)
WBC # BLD AUTO: 6.2 10E3/UL (ref 4–11)

## 2024-12-03 PROCEDURE — 36415 COLL VENOUS BLD VENIPUNCTURE: CPT | Performed by: NURSE PRACTITIONER

## 2024-12-03 PROCEDURE — 85027 COMPLETE CBC AUTOMATED: CPT | Performed by: NURSE PRACTITIONER

## 2024-12-03 PROCEDURE — 80048 BASIC METABOLIC PNL TOTAL CA: CPT | Performed by: NURSE PRACTITIONER

## 2024-12-03 PROCEDURE — P9604 ONE-WAY ALLOW PRORATED TRIP: HCPCS | Performed by: NURSE PRACTITIONER

## 2024-12-06 PROBLEM — L08.9 LOCALIZED INFECTION OF SKIN: Status: ACTIVE | Noted: 2024-12-06

## 2024-12-06 PROBLEM — J92.9 PLEURAL THICKENING: Status: ACTIVE | Noted: 2024-12-06

## 2024-12-06 PROBLEM — R31.0 GROSS HEMATURIA: Status: ACTIVE | Noted: 2024-12-06

## 2025-02-06 ENCOUNTER — TRANSFERRED RECORDS (OUTPATIENT)
Dept: HEALTH INFORMATION MANAGEMENT | Facility: CLINIC | Age: 80
End: 2025-02-06
Payer: COMMERCIAL